# Patient Record
Sex: FEMALE | Race: WHITE | NOT HISPANIC OR LATINO | Employment: FULL TIME | URBAN - METROPOLITAN AREA
[De-identification: names, ages, dates, MRNs, and addresses within clinical notes are randomized per-mention and may not be internally consistent; named-entity substitution may affect disease eponyms.]

---

## 2017-02-10 ENCOUNTER — TELEPHONE (OUTPATIENT)
Dept: FAMILY MEDICINE | Facility: CLINIC | Age: 62
End: 2017-02-10

## 2017-02-10 NOTE — TELEPHONE ENCOUNTER
YOGESH Rosado from [facility] called to follow up on CT scan done 02/2016 followed by a PET scan 06/09/2016 at [facility].  A 6 month follow up was recommended on PET scan to evaluative size of lung nodules. PA wants to verify that was done. Per chart review, no future orders were found or PET scan report. [facility] was called for report. Please review and advise on order  Or follow up in absence of PCP.

## 2017-02-10 NOTE — TELEPHONE ENCOUNTER
No report in chart.  Per OV 12/1/16 with Dr Dash Pt was being followed at [place], was to have been back there in November but no report yet from [place].  Wait for Dr Dash next week.  Notify [facility] that she will advise when she is back

## 2017-02-10 NOTE — TELEPHONE ENCOUNTER
SI was called. Left message with Adelaida with information below. She will have PA call if further questions. Message will be routed to PCP for review next week.

## 2017-03-01 DIAGNOSIS — E03.9 ACQUIRED HYPOTHYROIDISM: ICD-10-CM

## 2017-03-01 NOTE — TELEPHONE ENCOUNTER
LEVOTHYROXINE 0.125MG (125MCG) TAB     Last Written Prescription Date: 12/03/16  Last Quantity: 90, # refills: 0  Last Office Visit with G, P or [facility] prescribing provider: 12/01/16        TSH   Date Value Ref Range Status   12/01/2016 12.00 (H) 0.40 - 5.00 mU/L Final

## 2017-03-02 RX ORDER — LEVOTHYROXINE SODIUM 125 UG/1
TABLET ORAL
Qty: 15 TABLET | Refills: 0 | Status: SHIPPED | OUTPATIENT
Start: 2017-03-02 | End: 2017-03-07 | Stop reason: DRUGHIGH

## 2017-03-02 NOTE — TELEPHONE ENCOUNTER
This was a new dose in 12-16 for an abnormal TSH    Told pharm she needs to get it checked   Given only #15

## 2017-03-02 NOTE — TELEPHONE ENCOUNTER
"Routing refill request to provider for review/approval because:  Antionette given x1 and patient did not follow up, please advise  [place] she will need to find out when she can come for labs.  Is requesting a 30 day supply.        "

## 2017-03-04 DIAGNOSIS — E03.9 ACQUIRED HYPOTHYROIDISM: ICD-10-CM

## 2017-03-04 PROCEDURE — 84439 ASSAY OF FREE THYROXINE: CPT | Performed by: FAMILY MEDICINE

## 2017-03-04 PROCEDURE — 36415 COLL VENOUS BLD VENIPUNCTURE: CPT | Performed by: FAMILY MEDICINE

## 2017-03-04 PROCEDURE — 84443 ASSAY THYROID STIM HORMONE: CPT | Performed by: FAMILY MEDICINE

## 2017-03-06 LAB
T4 FREE SERPL-MCNC: 1.05 NG/DL (ref 0.76–1.46)
TSH SERPL DL<=0.005 MIU/L-ACNC: 7.42 MU/L (ref 0.4–4)

## 2017-03-07 ENCOUNTER — TELEPHONE (OUTPATIENT)
Dept: FAMILY MEDICINE | Facility: CLINIC | Age: 62
End: 2017-03-07

## 2017-03-07 DIAGNOSIS — E03.9 ACQUIRED HYPOTHYROIDISM: Primary | ICD-10-CM

## 2017-03-07 RX ORDER — LEVOTHYROXINE SODIUM 137 UG/1
137 TABLET ORAL DAILY
Qty: 90 TABLET | Refills: 0 | Status: SHIPPED | OUTPATIENT
Start: 2017-03-07 | End: 2017-06-05

## 2017-03-08 NOTE — TELEPHONE ENCOUNTER
Discussed all with pt  And the patient expresses understanding and she wants to go up to 137 mcg a day & rechek lab by 8 weeks   The prescription(s) have been sent to your pharmacy electronically and the future lab has been ordered. Please call us at [phone] to make a lab appointment.   Mail pt the note. Hard copy to station 2, BX

## 2017-05-15 DIAGNOSIS — E78.2 MIXED HYPERLIPIDEMIA: ICD-10-CM

## 2017-05-15 NOTE — TELEPHONE ENCOUNTER
SIMVASTATIN 20MG TABLETS    Last Written Prescription Date: 05/19/2016  Last Fill Quantity: 90, # refills: 3  Last Office Visit with [facility], [facility] or [facility] prescribing provider: 12/01/2016  Next 5 appointments (look out 90 days)     Jun 01, 2017 10:30 AM CDT   PHYSICAL with Karina Lozada MD   [facility]    [address] [place] [id number] [phone]                   Lab Results   Component Value Date    CHOL 181 12/01/2016     Lab Results   Component Value Date    HDL 65 12/01/2016     Lab Results   Component Value Date     12/01/2016     Lab Results   Component Value Date    TRIG 81 12/01/2016     Lab Results   Component Value Date    CHOLHDLRATIO 4.9 05/10/2013

## 2017-05-16 RX ORDER — SIMVASTATIN 20 MG
TABLET ORAL
Qty: 90 TABLET | Refills: 1 | Status: SHIPPED | OUTPATIENT
Start: 2017-05-16 | End: 2017-11-12

## 2017-05-16 NOTE — TELEPHONE ENCOUNTER
Prescription approved per [facility] Refill Protocol.  Jade Dennis RN- Triage [employer]

## 2017-05-31 DIAGNOSIS — E03.9 ACQUIRED HYPOTHYROIDISM: ICD-10-CM

## 2017-05-31 LAB — TSH SERPL DL<=0.005 MIU/L-ACNC: 1.77 MU/L (ref 0.4–4)

## 2017-05-31 PROCEDURE — 84443 ASSAY THYROID STIM HORMONE: CPT | Performed by: FAMILY MEDICINE

## 2017-05-31 PROCEDURE — 36415 COLL VENOUS BLD VENIPUNCTURE: CPT | Performed by: FAMILY MEDICINE

## 2017-06-01 ENCOUNTER — OFFICE VISIT (OUTPATIENT)
Dept: OBGYN | Facility: CLINIC | Age: 62
End: 2017-06-01
Payer: COMMERCIAL

## 2017-06-01 VITALS
WEIGHT: 187.9 LBS | DIASTOLIC BLOOD PRESSURE: 80 MMHG | HEART RATE: 86 BPM | SYSTOLIC BLOOD PRESSURE: 121 MMHG | TEMPERATURE: 98.4 F | BODY MASS INDEX: 31.31 KG/M2 | HEIGHT: 65 IN

## 2017-06-01 DIAGNOSIS — Z01.419 ENCOUNTER FOR GYNECOLOGICAL EXAMINATION WITHOUT ABNORMAL FINDING: Primary | ICD-10-CM

## 2017-06-01 DIAGNOSIS — Z91.89 DES EXPOSURE IN UTERO: ICD-10-CM

## 2017-06-01 PROCEDURE — G0145 SCR C/V CYTO,THINLAYER,RESCR: HCPCS | Performed by: OBSTETRICS & GYNECOLOGY

## 2017-06-01 PROCEDURE — 99396 PREV VISIT EST AGE 40-64: CPT | Performed by: OBSTETRICS & GYNECOLOGY

## 2017-06-01 RX ORDER — PREGABALIN 150 MG/1
CAPSULE ORAL
Refills: 0 | COMMUNITY
Start: 2017-05-26 | End: 2017-11-18

## 2017-06-01 NOTE — MR AVS SNAPSHOT
"              After Visit Summary   6/1/2017    Brea Back    MRN: [id number]           Patient Information     Date Of Birth          1955        Visit Information        Provider Department      6/1/2017 10:30 AM Karina Lozada MD [facility]        Today's Diagnoses     Encounter for gynecological examination without abnormal finding    -  1    NATHANIEL exposure in utero           Follow-ups after your visit        Who to contact     If you have questions or need follow up information about today's clinic visit or your schedule please contact [facility] directly at [phone].  Normal or non-critical lab and imaging results will be communicated to you by [employer], letter or phone within 4 business days after the clinic has received the results. If you do not hear from us within 7 days, please contact the clinic through [employer] or phone. If you have a critical or abnormal lab result, we will notify you by phone as soon as possible.  Submit refill requests through [employer] or call your pharmacy and they will forward the refill request to us. Please allow 3 business days for your refill to be completed.          Additional Information About Your Visit        MyChart Information     [employer] gives you secure access to your electronic health record. If you see a primary care provider, you can also send messages to your care team and make appointments. If you have questions, please call your primary care clinic.  If you do not have a primary care provider, please call [phone] and they will assist you.        Care EveryWhere ID     This is your Care EveryWhere ID. This could be used by other organizations to access your [facility] medical records  [id number]        Your Vitals Were     Pulse Temperature Height Last Period BMI (Body Mass Index)       86 98.4  F (36.9  C) (Oral) 5' 5\" (1.651 m) 06/04/2010 (LMP Unknown) 31.27 kg/m2        Blood Pressure from Last 3 Encounters: "   06/01/17 121/80   12/01/16 124/78   03/17/16 128/78    Weight from Last 3 Encounters:   06/01/17 187 lb 14.4 oz (85.2 kg)   12/01/16 180 lb (81.6 kg)   03/17/16 175 lb 3.2 oz (79.5 kg)              We Performed the Following     PAP imaged thin layer, diagnostic        Primary Care Provider Office Phone # Fax #    Ginger Dash [phone]       [facility] [address]  [facility] [address]        Thank you!     Thank you for choosing [facility]  for your care. Our goal is always to provide you with excellent care. Hearing back from our patients is one way we can continue to improve our services. Please take a few minutes to complete the written survey that you may receive in the mail after your visit with us. Thank you!             Your Updated Medication List - Protect others around you: Learn how to safely use, store and throw away your medicines at www.disposemymeds.org.          This list is accurate as of: 6/1/17 11:22 AM.  Always use your most recent med list.                   Brand Name Dispense Instructions for use    gabapentin 300 MG capsule    NEURONTIN    90 capsule    TAKE 1 CAPSULE BY MOUTH NIGHTLY AS NEEDED       levothyroxine 137 MCG tablet    SYNTHROID/LEVOTHROID    90 tablet    Take 1 tablet (137 mcg) by mouth daily       LYRICA 150 MG capsule   Generic drug:  pregabalin      TK ONE C PO  BID FOR NEUROPATHIC PAIN       omeprazole 20 MG CR capsule    priLOSEC    90 capsule    TAKE 1 CAPSULE BY MOUTH EVERY DAY       simvastatin 20 MG tablet    ZOCOR    90 tablet    TAKE 1 TABLET BY MOUTH EVERY NIGHT AT BEDTIME

## 2017-06-01 NOTE — PROGRESS NOTES
Brea is a 62 year old  who presents for annual breast and GYN exam.   Postmenopausal. She is having no menopausal symptoms. No vaginal bleeding noted. History of in utero NATHANIEL exposure, so needs yearly diagnostic pap smears.    Besides routine health maintenance, she has no other health concerns today . She moved to [place] last summer, but prefers to keep her medical care here.  GYNECOLOGIC HISTORY:  She is not sexually active.   History sexually transmitted infections:No STD history  Estrogen replacement therapy: No    History of abnormal Pap smear: YES - updated in Problem List and Health Maintenance accordingly  Family history of breast CA: No  Family history of uterine/ovarian CA: No  Family history of colon CA: No    HEALTH MAINTENANCE:  Last Mammogram: 2016 .   Pap; (  Lab Results   Component Value Date    PAP NIL 2016    PAP NIL 2015    PAP NIL 2014    )    HISTORY:  Obstetric History       T1      L2     SAB0   TAB0   Ectopic1   Multiple0   Live Births0       # Outcome Date GA Lbr Tulio/2nd Weight Sex Delivery Anes PTL Lv   3  07/15/83 40w0d  6 lb 3 oz (2.807 kg) M    SLY   2 Ectopic 07/15/79           1 Term 10/09/74 41w0d  6 lb 4 oz (2.835 kg) F    SLY        Past Medical History:   Diagnosis Date     Abnormal pap      Atypical glandular cells on Pap smear 2013     Cholelithiasis      Chronic hip pain     from accident many years ago, takes gabapentin occas prn     GERD (gastroesophageal reflux disease)      History of colposcopy with cervical biopsy 13    RALPH 1     Hypothyroidism      Menopause 2010     Past Surgical History:   Procedure Laterality Date     BREAST BIOPSY, CORE RT/LT      core biopsy left breast     BRONCHOSCOPY FLEXIBLE  2013    Procedure: BRONCHOSCOPY FLEXIBLE;  Bronchoscopy, with intraoperative C-Arm of Chest ;  Surgeon: Raiza Parker MD;  Location: [facility] OR     COLPOSCOPY, BIOPSY, COMBINED  2013     Procedure: COMBINED COLPOSCOPY, BIOPSY;;  Surgeon: Khris Castillo MD;  Location: [facility] OR     CONIZATION  approx  1999    cold knife cone bx     CONIZATION LEEP  6/14/2013    Procedure: CONIZATION LEEP;  Colposcopy of cervix and vagina, Loop Electrosurgical Excision Procedure, Hysteroscopy Dilation and Curettage, Endocervical Curettage;  Surgeon: Khris Castillo MD;  Location: [facility] OR     cyst removal foot and wrist       DILATION AND CURETTAGE, HYSTEROSCOPY DIAGNOSTIC, COMBINED  6/14/2013    Procedure: COMBINED DILATION AND CURETTAGE, HYSTEROSCOPY DIAGNOSTIC;;  Surgeon: Khris Csatillo MD;  Location: [facility] OR     EVACUATE ECTOPIC PREGNANCY  1979    laparotomy for ruptured ectopic     PAROTIDECTOMY Left 10/31/14    partial parotidectomy, benign     Family History   Problem Relation Age of Onset     CEREBROVASCULAR DISEASE Father      DIABETES Father      steroid induced     CANCER Father      Connective Tissue Disorder Father      lupus     DIABETES Brother      Type 2     C.A.D. Mother      Hypertension Mother      Psychotic Disorder Mother      depression     CANCER Paternal Grandmother      ? Gall bladder     Coronary Artery Disease No family hx of      Hyperlipidemia No family hx of      Breast Cancer No family hx of      Colon Cancer No family hx of      Prostate Cancer No family hx of      Other Cancer No family hx of      Depression No family hx of      Anxiety Disorder No family hx of      MENTAL ILLNESS No family hx of      Substance Abuse No family hx of      Anesthesia Reaction No family hx of      Asthma No family hx of      OSTEOPOROSIS No family hx of      Genetic Disorder No family hx of      Thyroid Disease No family hx of      Obesity No family hx of      Unknown/Adopted No family hx of      Social History     Social History     Marital status: Single     Spouse name: N/A     Number of children: 2     Years of education: N/A     Occupational History      Fms  "    Social History Main Topics     Smoking status: Current Every Day Smoker     Packs/day: 0.50     Years: 16.00     Types: Cigarettes     Smokeless tobacco: Never Used      Comment: Pt. smokes appx. 1/2 ppd     Alcohol use 0.6 - 1.2 oz/week     1 - 2 Standard drinks or equivalent per week      Comment: ocassionally     Drug use: No     Sexual activity: Yes     Partners: Male     Birth control/ protection: Post-menopausal     Other Topics Concern     Occupational Exposure Yes     powdered metal exposure at work     Seat Belt Yes     Self-Exams Yes     Parent/Sibling W/ Cabg, Mi Or Angioplasty Before 65f 55m? No     Social History Narrative       Current Outpatient Prescriptions:      simvastatin (ZOCOR) 20 MG tablet, TAKE 1 TABLET BY MOUTH EVERY NIGHT AT BEDTIME, Disp: 90 tablet, Rfl: 1     levothyroxine (SYNTHROID/LEVOTHROID) 137 MCG tablet, Take 1 tablet (137 mcg) by mouth daily, Disp: 90 tablet, Rfl: 0     gabapentin (NEURONTIN) 300 MG capsule, TAKE 1 CAPSULE BY MOUTH NIGHTLY AS NEEDED, Disp: 90 capsule, Rfl: 1     LYRICA 150 MG capsule, TK ONE C PO  BID FOR NEUROPATHIC PAIN, Disp: , Rfl: 0     omeprazole (PRILOSEC) 20 MG capsule, TAKE 1 CAPSULE BY MOUTH EVERY DAY (Patient not taking: Reported on 6/1/2017), Disp: 90 capsule, Rfl: 3  No Known Allergies    Past medical, surgical, social and family history were reviewed and updated in EPIC.    ROS:  12 point review of systems negative other than symptoms noted below.      EXAM:  /80  Pulse 86  Temp 98.4  F (36.9  C) (Oral)  Ht 5' 5\" (1.651 m)  Wt 187 lb 14.4 oz (85.2 kg)  LMP 06/04/2010 (LMP Unknown)  BMI 31.27 kg/m2   BMI: Body mass index is 31.27 kg/(m^2).  Constitutional: healthy, alert and no distress  Head: Normocephalic. No masses, lesions, tenderness or abnormalities  Neck: Neck supple. Trachea midline. No adenopathy. Thyroid symmetric, normal size.   Breast: No nodularity, asymmetry or nipple discharge bilaterally.  Gastrointestinal: Abdomen " soft, non-tender, non-distended. No masses, organomegaly  Vulva:  No external lesions, normal female hair distribution, no inguinal adenopathy.    Urethra:  Midline, non-tender, well supported, no discharge  Vagina:  Atrophic, no abnormal discharge, no lesions  Cervix: no lesions, no discharge  Uterus:  anteverted, smooth contour, without enlargement, mobile, and without tenderness  Ovaries:  No masses appreciated, non-tender, mobile  Rectal Exam: deferred  Musculoskeletal: extremities normal  Skin: no suspicious lesions or rashes  Psychiatric: Affect appropriate, cooperative, mentation appears normal.     COUNSELING:   Reviewed preventive health counseling, as reflected in patient instructions       (Luly)menopause management       Need for yearly paps given NATHANIEL exposure   reports that she has been smoking Cigarettes.  She has a 8.00 pack-year smoking history. She has never used smokeless tobacco.  Tobacco Cessation Action Plan: Information offered: Patient not interested at this time      FRAX Risk Assessment  ASSESSMENT:  62 year old  with satisfactory annual exam  (Z01.419) Encounter for gynecological examination without abnormal finding  (primary encounter diagnosis)  Comment:   Plan: yearly breast and pelvic exams recommended. Mammogram yearly to every other year per her preference.    (Z91.89) NATHANIEL exposure in utero  Comment:   Plan: As above, diagnostic paps yearly.    Karina Lozada MD

## 2017-06-05 ENCOUNTER — MYC MEDICAL ADVICE (OUTPATIENT)
Dept: FAMILY MEDICINE | Facility: CLINIC | Age: 62
End: 2017-06-05

## 2017-06-05 DIAGNOSIS — E03.9 ACQUIRED HYPOTHYROIDISM: ICD-10-CM

## 2017-06-05 LAB
COPATH REPORT: NORMAL
PAP: NORMAL

## 2017-06-05 RX ORDER — LEVOTHYROXINE SODIUM 137 UG/1
137 TABLET ORAL DAILY
Qty: 90 TABLET | Refills: 1 | Status: SHIPPED | OUTPATIENT
Start: 2017-06-05 | End: 2017-11-27

## 2017-10-10 ENCOUNTER — MYC REFILL (OUTPATIENT)
Dept: FAMILY MEDICINE | Facility: CLINIC | Age: 62
End: 2017-10-10

## 2017-10-10 DIAGNOSIS — M25.552 HIP PAIN, LEFT: ICD-10-CM

## 2017-10-10 DIAGNOSIS — M25.559 PAIN IN JOINT, PELVIC REGION AND THIGH, UNSPECIFIED LATERALITY: ICD-10-CM

## 2017-10-10 DIAGNOSIS — G89.29 OTHER CHRONIC PAIN: ICD-10-CM

## 2017-10-10 DIAGNOSIS — K21.9 ESOPHAGEAL REFLUX: ICD-10-CM

## 2017-10-10 NOTE — TELEPHONE ENCOUNTER
Omeprazole 20 mg      Last Written Prescription Date: 4/25/14  Last Fill Quantity: 90,  # refills: 3   Last Office Visit with [facility], [facility] or  [facility] [facility] prescribing provider: 12/1/16                                               Gabapentin 300 mg      Last Written Prescription Date:  4/25/14  Last Fill Quantity: 90,   # refills: 1  Last Office Visit with [facility], [facility] or  [facility] [facility] prescribing provider: 12/1/16  Future Office visit:       Routing refill request to provider for review/approval because:  Drug not on the [facility], [facility] or [facility] refill protocol or controlled substance

## 2017-10-10 NOTE — TELEPHONE ENCOUNTER
Message from [employer]:  Original authorizing provider: DO Brea Catalan would like a refill of the following medications:  omeprazole (PRILOSEC) 20 MG capsule [Aparna Hale DO]  gabapentin (NEURONTIN) 300 MG capsule [Aparna Hale DO]    Preferred pharmacy: [address] SERV[facility]    Comment:  Have acid reflux issue agian. And Gabapentin for hip issues

## 2017-10-11 RX ORDER — GABAPENTIN 300 MG/1
300 CAPSULE ORAL AT BEDTIME
Qty: 90 CAPSULE | Refills: 1 | OUTPATIENT
Start: 2017-10-11

## 2017-10-11 NOTE — TELEPHONE ENCOUNTER
Prescription approved per [facility] Refill Protocol. Prilosec    Controlled Substance Refill Request for Gabapentin  Problem List Complete:  No     PROVIDER TO CONSIDER COMPLETION OF PROBLEM LIST AND OVERVIEW/CONTROLLED SUBSTANCE AGREEMENT      Controlled substance agreement on file: No.     Processing:  Fax Rx to [facility] pharmacy     checked in past 6 months?  Yes 10-11-17   shows Gabapentin and Lyrica filled from outside providers only

## 2017-10-11 NOTE — TELEPHONE ENCOUNTER
Called patient and let her know that the omeprazole was filled but the gabapentin was not as she is over due for an OV. Patient now lives in [place] and will call another time to schedule an appointment.

## 2017-11-12 DIAGNOSIS — E78.2 MIXED HYPERLIPIDEMIA: ICD-10-CM

## 2017-11-14 RX ORDER — SIMVASTATIN 20 MG
TABLET ORAL
Qty: 90 TABLET | Refills: 0 | Status: SHIPPED | OUTPATIENT
Start: 2017-11-14 | End: 2018-02-11

## 2017-11-18 ENCOUNTER — OFFICE VISIT (OUTPATIENT)
Dept: FAMILY MEDICINE | Facility: CLINIC | Age: 62
End: 2017-11-18
Payer: COMMERCIAL

## 2017-11-18 VITALS
DIASTOLIC BLOOD PRESSURE: 80 MMHG | BODY MASS INDEX: 30.29 KG/M2 | OXYGEN SATURATION: 99 % | RESPIRATION RATE: 18 BRPM | SYSTOLIC BLOOD PRESSURE: 138 MMHG | HEART RATE: 84 BPM | TEMPERATURE: 98.1 F | WEIGHT: 182 LBS

## 2017-11-18 DIAGNOSIS — G89.29 OTHER CHRONIC PAIN: ICD-10-CM

## 2017-11-18 DIAGNOSIS — M25.559 PAIN IN JOINT, PELVIC REGION AND THIGH, UNSPECIFIED LATERALITY: ICD-10-CM

## 2017-11-18 DIAGNOSIS — E03.9 ACQUIRED HYPOTHYROIDISM: ICD-10-CM

## 2017-11-18 DIAGNOSIS — Z72.0 TOBACCO ABUSE: ICD-10-CM

## 2017-11-18 DIAGNOSIS — C34.91 ADENOCARCINOMA OF RIGHT LUNG (H): ICD-10-CM

## 2017-11-18 DIAGNOSIS — E66.09 CLASS 1 OBESITY DUE TO EXCESS CALORIES WITH SERIOUS COMORBIDITY AND BODY MASS INDEX (BMI) OF 31.0 TO 31.9 IN ADULT: ICD-10-CM

## 2017-11-18 DIAGNOSIS — E66.811 CLASS 1 OBESITY DUE TO EXCESS CALORIES WITH SERIOUS COMORBIDITY AND BODY MASS INDEX (BMI) OF 31.0 TO 31.9 IN ADULT: ICD-10-CM

## 2017-11-18 DIAGNOSIS — E78.2 MIXED HYPERLIPIDEMIA: Primary | ICD-10-CM

## 2017-11-18 PROCEDURE — 84443 ASSAY THYROID STIM HORMONE: CPT | Performed by: FAMILY MEDICINE

## 2017-11-18 PROCEDURE — 80061 LIPID PANEL: CPT | Performed by: FAMILY MEDICINE

## 2017-11-18 PROCEDURE — 80048 BASIC METABOLIC PNL TOTAL CA: CPT | Performed by: FAMILY MEDICINE

## 2017-11-18 PROCEDURE — 84450 TRANSFERASE (AST) (SGOT): CPT | Performed by: FAMILY MEDICINE

## 2017-11-18 PROCEDURE — 84460 ALANINE AMINO (ALT) (SGPT): CPT | Performed by: FAMILY MEDICINE

## 2017-11-18 PROCEDURE — 99214 OFFICE O/P EST MOD 30 MIN: CPT | Performed by: FAMILY MEDICINE

## 2017-11-18 PROCEDURE — 36415 COLL VENOUS BLD VENIPUNCTURE: CPT | Performed by: FAMILY MEDICINE

## 2017-11-18 RX ORDER — GABAPENTIN 300 MG/1
300 CAPSULE ORAL 3 TIMES DAILY
Qty: 270 CAPSULE | Refills: 1 | Status: SHIPPED | OUTPATIENT
Start: 2017-11-18

## 2017-11-18 ASSESSMENT — PATIENT HEALTH QUESTIONNAIRE - PHQ9
10. IF YOU CHECKED OFF ANY PROBLEMS, HOW DIFFICULT HAVE THESE PROBLEMS MADE IT FOR YOU TO DO YOUR WORK, TAKE CARE OF THINGS AT HOME, OR GET ALONG WITH OTHER PEOPLE: NOT DIFFICULT AT ALL
SUM OF ALL RESPONSES TO PHQ QUESTIONS 1-9: 2
SUM OF ALL RESPONSES TO PHQ QUESTIONS 1-9: 2

## 2017-11-18 ASSESSMENT — ANXIETY QUESTIONNAIRES
4. TROUBLE RELAXING: NOT AT ALL
5. BEING SO RESTLESS THAT IT IS HARD TO SIT STILL: NOT AT ALL
3. WORRYING TOO MUCH ABOUT DIFFERENT THINGS: NOT AT ALL
6. BECOMING EASILY ANNOYED OR IRRITABLE: NOT AT ALL
GAD7 TOTAL SCORE: 0
GAD7 TOTAL SCORE: 0
2. NOT BEING ABLE TO STOP OR CONTROL WORRYING: NOT AT ALL
7. FEELING AFRAID AS IF SOMETHING AWFUL MIGHT HAPPEN: NOT AT ALL
1. FEELING NERVOUS, ANXIOUS, OR ON EDGE: NOT AT ALL
GAD7 TOTAL SCORE: 0
7. FEELING AFRAID AS IF SOMETHING AWFUL MIGHT HAPPEN: NOT AT ALL

## 2017-11-18 NOTE — PROGRESS NOTES
SUBJECTIVE:   Brea Back is a 62 year old female who presents to clinic today for the following health issues:      Mixed Hyperlipidemia Follow-Up      Rate your low fat/cholesterol diet?: fair    Taking statin?  Yes, no muscle aches from 20mgm simvastatin    Other lipid medications/supplements?:  none    Hypothyroidism Follow-up    Since last visit, patient describes the following symptoms: dry skin- no other signs/symptoms   -was hypo till increase to 137mcg in 3-17 with wnl TSH in 5-17     Chronic Pain Follow-Up       Type / Location of Pain: hip  Analgesia/pain control:       Recent changes:  same      Overall control: Tolerable with discomfort  Activity level/function:      Daily activities:  Can do most things most days, with some rest    Work:  not applicable  Adverse effects:  No  Adherance    Taking medication as directed?  Yes    Participating in other treatments: not applicable  Risk Factors:    Sleep:  Fair    Mood/anxiety:  controlled    Recent family or social stressors:  none noted    Other aggravating factors: none  PHQ-9 SCORE 11/18/2017   Total Score MyChart 2 (Minimal depression)   Total Score 2     JOCELYNE-7 SCORE 11/18/2017   Total Score 0 (minimal anxiety)   Total Score 0     Encounter-Level CSA:     There are no encounter-level csa.        NONMORBID OBESITY    -BMI = 31.33   -comorbid hi LDL       TOBACCO ABUSE    -43 pk yr hx smoking till quit 2016 -6-17 resex lung ca/[facility]  -f/u CTs per [facility] with last one kk in 4-17      Amount of exercise or physical activity: None-will start her winter routine of walking on a treadmill     Problems taking medications regularly: No    Medication side effects: none  Diet: regular (no restrictions)          Problem list and histories reviewed & adjusted, as indicated.  Additional history: as documented    Labs reviewed in EPIC    Reviewed and updated as needed this visit by clinical staffTobacco  Allergies  Meds  Med Hx  Surg Hx  Fam Hx  Soc Hx       Reviewed and updated as needed this visit by Provider         ROS:  C: NEGATIVE for fever, chills, change in weight  I: NEGATIVE for worrisome rashes, moles or lesions  E: NEGATIVE for vision changes or irritation  E/M: NEGATIVE for ear, mouth and throat problems  R: NEGATIVE for significant cough or SOB  B: NEGATIVE for masses, tenderness or discharge  CV: NEGATIVE for chest pain, palpitations or peripheral edema  GI: NEGATIVE for nausea, abdominal pain, heartburn, or change in bowel habits  : NEGATIVE for frequency, dysuria, or hematuria  M: NEGATIVE for significant arthralgias or myalgia  N: NEGATIVE for weakness, dizziness or paresthesias  E: NEGATIVE for temperature intolerance, skin/hair changes  H: NEGATIVE for bleeding problems  P: NEGATIVE for changes in mood or affect    OBJECTIVE:     /80  Pulse 84  Temp 98.1  F (36.7  C) (Tympanic)  Resp 18  Wt 182 lb (82.6 kg)  LMP 06/04/2010 (LMP Unknown)  SpO2 99%  BMI 30.29 kg/m2  Body mass index is 30.29 kg/(m^2).  GENERAL: healthy, alert and no distress  EYES: Eyes grossly normal to inspection, PERRL and conjunctivae and sclerae normal  RESP: lungs clear to auscultation - no rales, rhonchi or wheezes  CV: regular rate and rhythm, normal S1 S2, no S3 or S4, no murmur, click or rub, no peripheral edema and peripheral pulses strong  MS: no gross musculoskeletal defects noted, no edema  SKIN: no suspicious lesions or rashes  NEURO: Normal strength and tone, mentation intact and speech normal  PSYCH: mentation appears normal, affect normal/bright    Diagnostic Test Results:  No results found for this or any previous visit (from the past 24 hour(s)).    ASSESSMENT/PLAN:               ICD-10-CM    1. Mixed hyperlipidemia E78.2 AST     ALT     Lipid panel reflex to direct LDL Fasting   2. Acquired hypothyroidism E03.9 TSH with free T4 reflex   3. Pain in joint, pelvic region and thigh, unspecified laterality M25.559 gabapentin (NEURONTIN) 300 MG capsule    4. Tobacco abuse:[age]y/o. off 7 yrs, @ 1ppd= 43 pk yr hx  --> 1/2 ppd :f/u per [facility] Z72.0    5. Adenocarcinoma of right lung (H)/[facility] 7-16 C34.91 AST     ALT     Basic metabolic panel   6. Class 1 obesity due to excess calories with serious comorbidity and body mass index (BMI) of 31.0 to 31.9 in adult comorbid hi LDL  E66.09     Z68.31    7. Other chronic pain G89.29 gabapentin (NEURONTIN) 300 MG capsule       Patient Instructions   1.  Weight Loss Tips  1. Do not eat after 6 hrs before your expected bedtime  2. Have your heaviest meal for breakfast, a slightly lighter meal at lunch and a snack 6 hrs before bed  3. No sugar/calorie drinks except milk ie no fruit juice, pop, alcohol.  4. Drink milk 30min before meals to decrease your hunger. Also it is excellent as part of your last meal of the day snack  5. Drink lots of water  6. Increase fiber in diet: all bran cereal, salads, popcorn etc  7. Have only one small serving of fruit a day about 1/2 cup (as this is high in sugar)  8. EXERCISE is the bottom line. Without it, you will gain weight even on a low calorie diet. Best if done 2-3X a day as can    Being overweight contributes to high blood pressure and high cholesterol, both of which cause heart attacks, strokes and kidney failure, prediabetes and diabetes, arthritis, and liver disease     2/ To help heal the  High acid causing your heartburn, we will prescribe an acid inhibitor that stops the stomach from producing acid. Please do not eat any acid, including oranges and citric acid fruits, any form of tomato, caffeine in coffee, tea and pop, no NSAIDs including aleve, advil, ibuprofen, and naprosyn NO alcohol. No vitamin C  Which is ascorbic acid   Eat your last food and drink> 5 hrs prior to bedtime  And sleep sitting upright     3. No difference was  Noted by patients in a double blind study when given codeine, tylenol ( acetaminophen) or ibuprofen (all in identical pills). They felt no difference in  pain relief. Since ibuprofen and the NSAIDs  causes kidney damage, esophageal damage with heartburn, and can increase the risk of esophageal and stomach cancer and ulcers,and colonic strictures. They also cause increased risk of heart attack .   I recommend that you use tylenol(acetaminophen) for pain. Use the acetaminophen ES  Which has 500mgm/tablet You can take up to 2 tablets 4 times a day as need for pain.  If this is not enough, you can add in ibuprofen or aleve(naprosyn) with 2 glasses of fluid and some food-to protect the stomach and esophagus. Please let us know if you are continuing to take ibuprofen or aleve, as we will need to periodically check your kidney function with a blood test.    4. Try using only ranitidine 150mgm 2 times a day     5. Your blood pressure has been high or you are starting a new medication for it :   Please take 2-4 blood pressures and heart rates a week and write them down with date, time of day and location and bring this record in in 3-5 weeks. The optimal blood  pressure is < 140/80 or close to this most of the time.      Will cont to f/u with [place] for CT of lungs s/po lung ca resex   Ginger Dash MD  [facility]    Weight management plan: Discussed healthy diet and exercise guidelines and patient will follow up in 3 months in clinic to re-evaluate.    Reassured her that her BP is with in normal limits      Ginger Dash MD    Answers for HPI/ROS submitted by the patient on 11/18/2017   If you checked off any problems, how difficult have these problems made it for you to do your work, take care of things at home, or get along with other people?: Not difficult at all  PHQ9 TOTAL SCORE: 2  JOCELYNE 7 TOTAL SCORE: 0

## 2017-11-18 NOTE — PATIENT INSTRUCTIONS
1.  Weight Loss Tips  1. Do not eat after 6 hrs before your expected bedtime  2. Have your heaviest meal for breakfast, a slightly lighter meal at lunch and a snack 6 hrs before bed  3. No sugar/calorie drinks except milk ie no fruit juice, pop, alcohol.  4. Drink milk 30min before meals to decrease your hunger. Also it is excellent as part of your last meal of the day snack  5. Drink lots of water  6. Increase fiber in diet: all bran cereal, salads, popcorn etc  7. Have only one small serving of fruit a day about 1/2 cup (as this is high in sugar)  8. EXERCISE is the bottom line. Without it, you will gain weight even on a low calorie diet. Best if done 2-3X a day as can    Being overweight contributes to high blood pressure and high cholesterol, both of which cause heart attacks, strokes and kidney failure, prediabetes and diabetes, arthritis, and liver disease     2/ To help heal the  High acid causing your heartburn, we will prescribe an acid inhibitor that stops the stomach from producing acid. Please do not eat any acid, including oranges and citric acid fruits, any form of tomato, caffeine in coffee, tea and pop, no NSAIDs including aleve, advil, ibuprofen, and naprosyn NO alcohol. No vitamin C  Which is ascorbic acid   Eat your last food and drink> 5 hrs prior to bedtime  And sleep sitting upright     3. No difference was  Noted by patients in a double blind study when given codeine, tylenol ( acetaminophen) or ibuprofen (all in identical pills). They felt no difference in pain relief. Since ibuprofen and the NSAIDs  causes kidney damage, esophageal damage with heartburn, and can increase the risk of esophageal and stomach cancer and ulcers,and colonic strictures. They also cause increased risk of heart attack .   I recommend that you use tylenol(acetaminophen) for pain. Use the acetaminophen ES  Which has 500mgm/tablet You can take up to 2 tablets 4 times a day as need for pain.  If this is not enough, you  can add in ibuprofen or aleve(naprosyn) with 2 glasses of fluid and some food-to protect the stomach and esophagus. Please let us know if you are continuing to take ibuprofen or aleve, as we will need to periodically check your kidney function with a blood test.    4. Try using only ranitidine 150mgm 2 times a day     5. Your blood pressure has been high or you are starting a new medication for it :   Please take 2-4 blood pressures and heart rates a week and write them down with date, time of day and location and bring this record in in 3-5 weeks. The optimal blood  pressure is < 140/80 or close to this most of the time.

## 2017-11-18 NOTE — NURSING NOTE
"Chief Complaint   Patient presents with     Lipids     Thyroid Problem       Initial /80  Pulse 84  Temp 98.1  F (36.7  C) (Tympanic)  Resp 18  Wt 182 lb (82.6 kg)  LMP 06/04/2010 (LMP Unknown)  SpO2 99%  BMI 30.29 kg/m2 Estimated body mass index is 30.29 kg/(m^2) as calculated from the following:    Height as of 6/1/17: 5' 5\" (1.651 m).    Weight as of this encounter: 182 lb (82.6 kg).  Medication Reconciliation: complete    "

## 2017-11-18 NOTE — LETTER
November 21, 2017      Brea Back  [id number] [address] [address]        Dear ,    We are writing to inform you of your test results.    All of your lab results are normal, except as noted below.     The following are explanations of some of our lab tests     THIS DOES NOT MEAN THAT YOU HAD ALL OF THESE DONE     Please continue on the same medications unless a change is noted above     These are some general explanations for tests:     Hgb is the blood iron level   WBC means White Blood Cells   Platelets are small blood cells that help with forming the blood clots along with other blood factors.   Electrolytes are Sodium, Potassium, Calcium, Magnesium, Phosphorus.   Liver tests are: AST, ALT, Bilirubin, Alkaline Phosphatase.   Kidney tests are Creatinine, GFR.   HDL Cholesterol - is the good cholesterol and it is good to have it high.   LDL cholesterol is the bad cholesterol and it is good to have it low.   It is recommended to have LDL less than 130 for people with hypertension and to have it less than 100 for people with heart disease, diabetes and chronic kidney disease.   Triglycerides are another type of lipid and can also cause heart disease so should be kept low. ###quite a bit higher   We will rechek in 6 mo  As this elevation could be from diet as this does vary a lot with diet ###   Thyroid tests are TSH, T4, T3   Glucose is sugar###a little high   ###The only way known to prevent diabetes or keep it from getting worse is exercise, 20-40 minutes 3 times a day around the time of meals as your insulin is wearing out  You need to get rid of the sugar using your muscles     A1c is a test that gives us an idea about how well was controlled the diabetes for the last 3 months.   PSA stands for Prostate Specific Antigen and it can be elevated with prostate cancer or prostate inflammation.     Resulted Orders   AST   Result Value Ref Range    AST 18 0 - 45 U/L   ALT   Result Value Ref Range    ALT 32  0 - 50 U/L   Lipid panel reflex to direct LDL Fasting   Result Value Ref Range    Cholesterol 206 (H) <200 mg/dL      Comment:      Desirable:       <200 mg/dl    Triglycerides 253 (H) <150 mg/dL      Comment:      Borderline high:  150-199 mg/dl  High:             200-499 mg/dl  Very high:       >499 mg/dl  Fasting specimen      HDL Cholesterol 53 >49 mg/dL    LDL Cholesterol Calculated 102 (H) <100 mg/dL      Comment:      Above desirable:  100-129 mg/dl  Borderline High:  130-159 mg/dL  High:             160-189 mg/dL  Very high:       >189 mg/dl      Non HDL Cholesterol 153 (H) <130 mg/dL      Comment:      Above Desirable:  130-159 mg/dl  Borderline high:  160-189 mg/dl  High:             190-219 mg/dl  Very high:       >219 mg/dl     Basic metabolic panel   Result Value Ref Range    Sodium 139 133 - 144 mmol/L    Potassium 4.2 3.4 - 5.3 mmol/L    Chloride 107 94 - 109 mmol/L    Carbon Dioxide 24 20 - 32 mmol/L    Anion Gap 8 3 - 14 mmol/L    Glucose 103 (H) 70 - 99 mg/dL      Comment:      Fasting specimen    Urea Nitrogen 13 7 - 30 mg/dL    Creatinine 0.73 0.52 - 1.04 mg/dL    GFR Estimate 81 >60 mL/min/1.7m2      Comment:      Non  GFR Calc    GFR Estimate If Black >90 >60 mL/min/1.7m2      Comment:       GFR Calc    Calcium 9.3 8.5 - 10.1 mg/dL   TSH with free T4 reflex   Result Value Ref Range    TSH 1.77 0.40 - 4.00 mU/L       If you have any questions or concerns, please call the clinic at the number listed above.       Sincerely,        Ginger Dash MD

## 2017-11-19 LAB
ALT SERPL W P-5'-P-CCNC: 32 U/L (ref 0–50)
ANION GAP SERPL CALCULATED.3IONS-SCNC: 8 MMOL/L (ref 3–14)
AST SERPL W P-5'-P-CCNC: 18 U/L (ref 0–45)
BUN SERPL-MCNC: 13 MG/DL (ref 7–30)
CALCIUM SERPL-MCNC: 9.3 MG/DL (ref 8.5–10.1)
CHLORIDE SERPL-SCNC: 107 MMOL/L (ref 94–109)
CHOLEST SERPL-MCNC: 206 MG/DL
CO2 SERPL-SCNC: 24 MMOL/L (ref 20–32)
CREAT SERPL-MCNC: 0.73 MG/DL (ref 0.52–1.04)
GFR SERPL CREATININE-BSD FRML MDRD: 81 ML/MIN/1.7M2
GLUCOSE SERPL-MCNC: 103 MG/DL (ref 70–99)
HDLC SERPL-MCNC: 53 MG/DL
LDLC SERPL CALC-MCNC: 102 MG/DL
NONHDLC SERPL-MCNC: 153 MG/DL
POTASSIUM SERPL-SCNC: 4.2 MMOL/L (ref 3.4–5.3)
SODIUM SERPL-SCNC: 139 MMOL/L (ref 133–144)
TRIGL SERPL-MCNC: 253 MG/DL
TSH SERPL DL<=0.005 MIU/L-ACNC: 1.77 MU/L (ref 0.4–4)

## 2017-11-19 ASSESSMENT — ANXIETY QUESTIONNAIRES: GAD7 TOTAL SCORE: 0

## 2017-11-21 NOTE — PROGRESS NOTES
Please see attached lab results  All of your lab results are normal, except as noted below.    The following are explanations of some of our lab tests    THIS DOES NOT MEAN THAT YOU HAD ALL OF THESE DONE    Please continue on the same medications unless a change is noted above    These are some general explanations for tests:    Hgb is the blood iron level  WBC means White Blood Cells  Platelets are small blood cells that help with forming the blood clots along with other blood factors.  Electrolytes are Sodium, Potassium, Calcium, Magnesium, Phosphorus.  Liver tests are: AST, ALT, Bilirubin, Alkaline Phosphatase.  Kidney tests are Creatinine, GFR.  HDL Cholesterol - is the good cholesterol and it is good to have it high.  LDL cholesterol is the bad cholesterol and it is good to have it low.  It is recommended to have LDL less than 130 for people with hypertension and to have it less than 100 for people with heart disease, diabetes and chronic kidney disease.  Triglycerides are another type of lipid and can also cause heart disease so should be kept low. ###quite a bit higher   We will rechek in 6 mo  As this elevation could be from diet as this does vary a lot with diet ###  Thyroid tests are TSH, T4, T3  Glucose is sugar###a little high   ###The only way known to prevent diabetes or keep it from getting worse is exercise, 20-40 minutes 3 times a day around the time of meals as your insulin is wearing out  You need to get rid of the sugar using your muscles     A1c is a test that gives us an idea about how well was controlled the diabetes for the last 3 months.   PSA stands for Prostate Specific Antigen and it can be elevated with prostate cancer or prostate inflammation.

## 2017-11-27 DIAGNOSIS — E03.9 ACQUIRED HYPOTHYROIDISM: ICD-10-CM

## 2017-11-27 RX ORDER — LEVOTHYROXINE SODIUM 137 UG/1
TABLET ORAL
Qty: 90 TABLET | Refills: 3 | Status: SHIPPED | OUTPATIENT
Start: 2017-11-27 | End: 2018-11-06

## 2017-11-27 NOTE — TELEPHONE ENCOUNTER
Last OV 11/18/2017.  Prescription approved per [facility] Refill Protocol.    Requested Prescriptions   Pending Prescriptions Disp Refills     levothyroxine (SYNTHROID/LEVOTHROID) 137 MCG tablet [Pharmacy Med Name: LEVOTHYROXINE 0.137MG (137MCG) TAB] 90 tablet 0     Sig: TAKE 1 TABLET(137 MCG) BY MOUTH DAILY    Thyroid Protocol Passed    11/27/2017 11:59 AM       Passed - Patient is 12 years or older       Passed - Recent or future visit with authorizing provider's specialty    Patient had office visit in the last year or has a visit in the next 30 days with authorizing provider.  See chart review.              Passed - Normal TSH on file in past 12 months    Recent Labs   Lab Test  11/18/17   1031   TSH  1.77             Passed - No active pregnancy on record    If patient is pregnant or has had a positive pregnancy test, please check TSH.         Passed - No positive pregnancy test in past 12 months    If patient is pregnant or has had a positive pregnancy test, please check TSH.

## 2018-01-03 DIAGNOSIS — K21.9 ESOPHAGEAL REFLUX: ICD-10-CM

## 2018-02-11 DIAGNOSIS — E78.2 MIXED HYPERLIPIDEMIA: ICD-10-CM

## 2018-02-12 RX ORDER — SIMVASTATIN 20 MG
TABLET ORAL
Qty: 90 TABLET | Refills: 0 | Status: SHIPPED | OUTPATIENT
Start: 2018-02-12 | End: 2018-05-11

## 2018-02-12 NOTE — TELEPHONE ENCOUNTER
Per chart review, Plan reads pt needs to check labs in 6 months. 3 month supply approved. Future order placed.

## 2018-02-12 NOTE — TELEPHONE ENCOUNTER
"Requested Prescriptions   Pending Prescriptions Disp Refills     simvastatin (ZOCOR) 20 MG tablet [Pharmacy Med Name: SIMVASTATIN 20MG TABLETS]  Last Written Prescription Date:  11-14-17  Last Fill Quantity: 90,  # refills: 0   Last Office Visit  11/18/2017        with  [facility], [facility] or [facility] prescribing provider:     Future Office Visit:        90 tablet 0     Sig: TAKE ONE TABLET BY MOUTH EVERY NIGHT AT BEDTIME    Statins Protocol Passed    2/11/2018  3:21 AM       Passed - LDL on file in past 12 months    Recent Labs   Lab Test  11/18/17   1031   LDL  102*            Passed - No abnormal creatine kinase in past 12 months    No lab results found.         Passed - Recent or future visit with authorizing provider    Patient had office visit in the last year or has a visit in the next 30 days with authorizing provider.  See \"Patient Info\" tab in inbasket, or \"Choose Columns\" in Meds & Orders section of the refill encounter.            Passed - Patient is age 18 or older       Passed - No active pregnancy on record       Passed - No positive pregnancy test in past 12 months          "

## 2018-06-20 ENCOUNTER — TELEPHONE (OUTPATIENT)
Dept: FAMILY MEDICINE | Facility: CLINIC | Age: 63
End: 2018-06-20

## 2018-06-20 NOTE — TELEPHONE ENCOUNTER
Panel Management Review      Composite cancer screening  Chart review shows that this patient is due/due soon for the following Mammogram  Summary:    Patient is due/failing the following:   MAMMOGRAM    Action needed:   Patient needs office visit for mammo.    Type of outreach:    Sent letter.    Questions for provider review:    None                                                                                                                                    Era VASQUEZ

## 2018-06-20 NOTE — LETTER
June 20, 2018    Brea Back  [id number] [address] [address]    Dear Donny Guidry cares about your health and your health plan.  I have reviewed your medical conditions, medication list and lab results, and am making recommendations based on this review to better manage your health.    You are in particular need of attention regarding:  -Breast Cancer Screening  I am recommending that you:     -schedule a MAMMOGRAM which is due.  1 in 8 women will develop invasive breast cancer during her lifetime and it is the most common non-skin cancer in American women.  EARLY detection, new treatments, and a better understanding of the disease have increased survival rates - the 5 year survival rate in the 1960s was 63% and today it is close to 90%.    If you are under/uninsured, we recommend you contact the [facility]. They offer mammograms at no charge or on a sliding fee charge. You can schedule with them at [phone]. Please have them send us the results.      Please disregard this reminder if you have had this exam elsewhere within the last year.  It would be helpful for us to have a copy of your mammogram report in your file so that we can best coordinate your care - please contact us with when your test was done so we can update your record.             Please call us at the [place] location:  [phone] or use [employer] to address the above recommendations.     Thank you for trusting [facility].  We appreciate the opportunity to serve you and look forward to supporting your healthcare in the future.    If you have (or plan to have) any of these tests done at a facility other than a [facility] or a [facility], please have the results sent to the [facility] location noted above.      Best Regards,    Ginger Dash MD

## 2018-07-07 ENCOUNTER — HEALTH MAINTENANCE LETTER (OUTPATIENT)
Age: 63
End: 2018-07-07

## 2018-11-06 ENCOUNTER — TELEPHONE (OUTPATIENT)
Dept: GENERAL RADIOLOGY | Facility: CLINIC | Age: 63
End: 2018-11-06

## 2018-11-06 DIAGNOSIS — E03.9 ACQUIRED HYPOTHYROIDISM: ICD-10-CM

## 2018-11-06 NOTE — TELEPHONE ENCOUNTER
"Requested Prescriptions   Pending Prescriptions Disp Refills     levothyroxine (SYNTHROID/LEVOTHROID) 137 MCG tablet [Pharmacy Med Name: LEVOTHYROXINE 0.137MG (137MCG) TAB]  Last Written Prescription Date:  11/27/17  Last Fill Quantity: 90,  # refills: 3   Last office visit: 11/18/2017 with prescribing provider:  Ekta   Future Office Visit:     90 tablet 0     Sig: TAKE 1 TABLET(137 MCG) BY MOUTH DAILY    Thyroid Protocol Passed    11/6/2018  3:29 AM       Passed - Patient is 12 years or older       Passed - Recent (12 mo) or future (30 days) visit within the authorizing provider's specialty    Patient had office visit in the last 12 months or has a visit in the next 30 days with authorizing provider or within the authorizing provider's specialty.  See \"Patient Info\" tab in inbasket, or \"Choose Columns\" in Meds & Orders section of the refill encounter.             Passed - Normal TSH on file in past 12 months    Recent Labs   Lab Test  11/18/17   1031   TSH  1.77             Passed - No active pregnancy on record    If patient is pregnant or has had a positive pregnancy test, please check TSH.         Passed - No positive pregnancy test in past 12 months    If patient is pregnant or has had a positive pregnancy test, please check TSH.            "

## 2018-11-07 RX ORDER — LEVOTHYROXINE SODIUM 137 UG/1
TABLET ORAL
Qty: 30 TABLET | Refills: 0 | Status: SHIPPED | OUTPATIENT
Start: 2018-11-07

## 2018-11-07 NOTE — TELEPHONE ENCOUNTER
11/6/2018  Call Regarding Preventive Health Screening VIP Mammogram     Attempt 1     Message on [facility]  ?  ?  Outreach   fannie

## 2018-11-16 ENCOUNTER — TELEPHONE (OUTPATIENT)
Dept: OBGYN | Facility: CLINIC | Age: 63
End: 2018-11-16

## 2018-11-16 NOTE — TELEPHONE ENCOUNTER
Pt is past due for f/u pap smear.  LMTC and schedule at [facility].  Andreia Marquez,    Pap Tracking

## 2019-02-28 ENCOUNTER — TELEPHONE (OUTPATIENT)
Dept: FAMILY MEDICINE | Facility: CLINIC | Age: 64
End: 2019-02-28

## 2019-02-28 NOTE — LETTER
February 28, 2019    Brea Back  [id number] [address] [address]    Dear Donny Guidry cares about your health and your health plan.  I have reviewed your medical conditions, medication list and lab results, and am making recommendations based on this review to better manage your health.    You are in particular need of attention regarding:  -Depression/Anxiety  -Breast Cancer Screening  -Cervical Cancer Screening  -Wellness (Physical) Visit     I am recommending that you:     -schedule a WELLNESS (Physical) APPOINTMENT with me.   I will check fasting labs the same day - nothing to eat except water and meds for 8-10 hours prior.      -schedule a MAMMOGRAM which is due.    1 in 8 women will develop invasive breast cancer during her lifetime and it is the most common non-skin cancer in American women.  EARLY detection, new treatments, and a better understanding of the disease have increased survival rates - the 5 year survival rate in the 1960s was 63% and today it is close to 90%.    If you are under/uninsured, we recommend you contact the [facility]. They offer mammograms at no charge or on a sliding fee charge. You can schedule with them at [phone]. Please have them send us the results.      Please disregard this reminder if you have had this exam elsewhere within the last year.  It would be helpful for us to have a copy of your mammogram report in your file so that we can best coordinate your care - please contact us with when your test was done so we can update your record.             -schedule a PAP SMEAR EXAM which is due.  Please disregard this reminder if you have had this exam elsewhere within the last year.  It would be helpful for us to have a copy of your recent pap smear report in our file so that we can best coordinate your care.    If you are under/uninsured, we recommend you contact the [facility]. They offer pap smears at no charge or on a sliding fee charge. You can schedule  with them at [phone]. Please have them send us the results.    Please complete the enclosed PHQ9 and mail back to clinic in the envelope provided.         Please call us at the [place] location:  [phone] or use [employer] to address the above recommendations.     Thank you for trusting [facility].  We appreciate the opportunity to serve you and look forward to supporting your healthcare in the future.    If you have (or plan to have) any of these tests done at a facility other than a [facility] or a [facility], please have the results sent to the [facility] location noted above.      Best Regards,    Ginger Dash MD

## 2019-02-28 NOTE — TELEPHONE ENCOUNTER
Panel Management Review      Patient has the following on her problem list: None      Composite cancer screening  Chart review shows that this patient is due/due soon for the following Pap Smear and Mammogram  Summary:    Patient is due/failing the following:   MAMMOGRAM, PAP, PHQ9 and PHYSICAL    Action needed:   Patient needs to do PHQ9.    Type of outreach:    Sent letter.    Questions for provider review:    None                                                                                                                                    Tash Duong CMA       Chart routed to  .

## 2019-03-21 ASSESSMENT — ANXIETY QUESTIONNAIRES
2. NOT BEING ABLE TO STOP OR CONTROL WORRYING: NOT AT ALL
1. FEELING NERVOUS, ANXIOUS, OR ON EDGE: NOT AT ALL
6. BECOMING EASILY ANNOYED OR IRRITABLE: NOT AT ALL
5. BEING SO RESTLESS THAT IT IS HARD TO SIT STILL: NOT AT ALL
3. WORRYING TOO MUCH ABOUT DIFFERENT THINGS: NOT AT ALL
IF YOU CHECKED OFF ANY PROBLEMS ON THIS QUESTIONNAIRE, HOW DIFFICULT HAVE THESE PROBLEMS MADE IT FOR YOU TO DO YOUR WORK, TAKE CARE OF THINGS AT HOME, OR GET ALONG WITH OTHER PEOPLE: NOT DIFFICULT AT ALL
7. FEELING AFRAID AS IF SOMETHING AWFUL MIGHT HAPPEN: NOT AT ALL
GAD7 TOTAL SCORE: 0

## 2019-03-21 ASSESSMENT — PATIENT HEALTH QUESTIONNAIRE - PHQ9
5. POOR APPETITE OR OVEREATING: NOT AT ALL
SUM OF ALL RESPONSES TO PHQ QUESTIONS 1-9: 2

## 2019-03-22 ASSESSMENT — ANXIETY QUESTIONNAIRES: GAD7 TOTAL SCORE: 0

## 2019-10-01 ENCOUNTER — HEALTH MAINTENANCE LETTER (OUTPATIENT)
Age: 64
End: 2019-10-01

## 2020-03-22 ENCOUNTER — HEALTH MAINTENANCE LETTER (OUTPATIENT)
Age: 65
End: 2020-03-22

## 2021-01-15 ENCOUNTER — HEALTH MAINTENANCE LETTER (OUTPATIENT)
Age: 66
End: 2021-01-15

## 2021-05-15 ENCOUNTER — HEALTH MAINTENANCE LETTER (OUTPATIENT)
Age: 66
End: 2021-05-15

## 2021-09-04 ENCOUNTER — HEALTH MAINTENANCE LETTER (OUTPATIENT)
Age: 66
End: 2021-09-04

## 2022-06-11 ENCOUNTER — HEALTH MAINTENANCE LETTER (OUTPATIENT)
Age: 67
End: 2022-06-11

## 2022-10-16 ENCOUNTER — HEALTH MAINTENANCE LETTER (OUTPATIENT)
Age: 67
End: 2022-10-16

## 2023-04-01 ENCOUNTER — HEALTH MAINTENANCE LETTER (OUTPATIENT)
Age: 68
End: 2023-04-01

## 2023-06-17 ENCOUNTER — HEALTH MAINTENANCE LETTER (OUTPATIENT)
Age: 68
End: 2023-06-17
